# Patient Record
Sex: MALE | Race: WHITE | NOT HISPANIC OR LATINO
[De-identification: names, ages, dates, MRNs, and addresses within clinical notes are randomized per-mention and may not be internally consistent; named-entity substitution may affect disease eponyms.]

---

## 2017-03-09 ENCOUNTER — RX RENEWAL (OUTPATIENT)
Age: 72
End: 2017-03-09

## 2017-04-11 ENCOUNTER — NON-APPOINTMENT (OUTPATIENT)
Age: 72
End: 2017-04-11

## 2017-04-11 ENCOUNTER — APPOINTMENT (OUTPATIENT)
Dept: CARDIOLOGY | Facility: CLINIC | Age: 72
End: 2017-04-11

## 2017-04-11 VITALS — SYSTOLIC BLOOD PRESSURE: 120 MMHG | DIASTOLIC BLOOD PRESSURE: 84 MMHG

## 2017-04-11 VITALS
HEART RATE: 71 BPM | WEIGHT: 145 LBS | SYSTOLIC BLOOD PRESSURE: 140 MMHG | BODY MASS INDEX: 20.76 KG/M2 | OXYGEN SATURATION: 99 % | HEIGHT: 70 IN | DIASTOLIC BLOOD PRESSURE: 70 MMHG

## 2017-04-11 RX ORDER — CEFUROXIME AXETIL 500 MG/1
500 TABLET ORAL
Qty: 20 | Refills: 0 | Status: COMPLETED | COMMUNITY
Start: 2016-12-20

## 2017-04-12 LAB
25(OH)D3 SERPL-MCNC: 43.1 NG/ML
ALBUMIN SERPL ELPH-MCNC: 4.4 G/DL
ALP BLD-CCNC: 64 U/L
ALT SERPL-CCNC: 15 U/L
ANION GAP SERPL CALC-SCNC: 19 MMOL/L
APPEARANCE: CLEAR
AST SERPL-CCNC: 16 U/L
BASOPHILS # BLD AUTO: 0.03 K/UL
BASOPHILS NFR BLD AUTO: 0.5 %
BILIRUB SERPL-MCNC: 0.7 MG/DL
BILIRUBIN URINE: NEGATIVE
BLOOD URINE: NEGATIVE
BUN SERPL-MCNC: 16 MG/DL
CALCIUM SERPL-MCNC: 9.5 MG/DL
CHLORIDE SERPL-SCNC: 102 MMOL/L
CHOLEST SERPL-MCNC: 233 MG/DL
CHOLEST/HDLC SERPL: 2.9 RATIO
CO2 SERPL-SCNC: 22 MMOL/L
COLOR: YELLOW
CREAT SERPL-MCNC: 1.05 MG/DL
EOSINOPHIL # BLD AUTO: 0.27 K/UL
EOSINOPHIL NFR BLD AUTO: 4.7 %
GLUCOSE QUALITATIVE U: NORMAL MG/DL
GLUCOSE SERPL-MCNC: 90 MG/DL
HBA1C MFR BLD HPLC: 5.5 %
HCT VFR BLD CALC: 43.9 %
HDLC SERPL-MCNC: 79 MG/DL
HGB BLD-MCNC: 14 G/DL
IMM GRANULOCYTES NFR BLD AUTO: 0.2 %
KETONES URINE: NEGATIVE
LDLC SERPL CALC-MCNC: 143 MG/DL
LEUKOCYTE ESTERASE URINE: NEGATIVE
LYMPHOCYTES # BLD AUTO: 1.41 K/UL
LYMPHOCYTES NFR BLD AUTO: 24.5 %
MAN DIFF?: NORMAL
MCHC RBC-ENTMCNC: 31.5 PG
MCHC RBC-ENTMCNC: 31.9 GM/DL
MCV RBC AUTO: 98.7 FL
MONOCYTES # BLD AUTO: 0.42 K/UL
MONOCYTES NFR BLD AUTO: 7.3 %
NEUTROPHILS # BLD AUTO: 3.62 K/UL
NEUTROPHILS NFR BLD AUTO: 62.8 %
NITRITE URINE: NEGATIVE
PH URINE: 5
PLATELET # BLD AUTO: 265 K/UL
POTASSIUM SERPL-SCNC: 5 MMOL/L
PROT SERPL-MCNC: 6.8 G/DL
PROTEIN URINE: NEGATIVE MG/DL
PSA FREE FLD-MCNC: 25.6 %
PSA FREE SERPL-MCNC: 0.34 NG/ML
PSA SERPL-MCNC: 1.31 NG/ML
RBC # BLD: 4.45 M/UL
RBC # FLD: 13.6 %
SODIUM SERPL-SCNC: 143 MMOL/L
SPECIFIC GRAVITY URINE: 1.02
T4 SERPL-MCNC: 7 UG/DL
TRIGL SERPL-MCNC: 55 MG/DL
TSH SERPL-ACNC: 1.99 UIU/ML
UROBILINOGEN URINE: NORMAL MG/DL
WBC # FLD AUTO: 5.76 K/UL

## 2017-08-15 ENCOUNTER — APPOINTMENT (OUTPATIENT)
Dept: CARDIOLOGY | Facility: CLINIC | Age: 72
End: 2017-08-15

## 2017-11-06 ENCOUNTER — NON-APPOINTMENT (OUTPATIENT)
Age: 72
End: 2017-11-06

## 2017-11-06 ENCOUNTER — APPOINTMENT (OUTPATIENT)
Dept: CARDIOLOGY | Facility: CLINIC | Age: 72
End: 2017-11-06
Payer: MEDICARE

## 2017-11-06 VITALS
OXYGEN SATURATION: 99 % | WEIGHT: 147 LBS | HEART RATE: 69 BPM | DIASTOLIC BLOOD PRESSURE: 68 MMHG | SYSTOLIC BLOOD PRESSURE: 161 MMHG | BODY MASS INDEX: 21.09 KG/M2

## 2017-11-06 PROCEDURE — G0008: CPT

## 2017-11-06 PROCEDURE — 90662 IIV NO PRSV INCREASED AG IM: CPT

## 2017-11-06 PROCEDURE — 93000 ELECTROCARDIOGRAM COMPLETE: CPT

## 2017-11-06 PROCEDURE — 99215 OFFICE O/P EST HI 40 MIN: CPT

## 2017-11-08 LAB
ALBUMIN SERPL ELPH-MCNC: 4.4 G/DL
ALP BLD-CCNC: 67 U/L
ALT SERPL-CCNC: 20 U/L
ANION GAP SERPL CALC-SCNC: 15 MMOL/L
AST SERPL-CCNC: 22 U/L
BASOPHILS # BLD AUTO: 0.05 K/UL
BASOPHILS NFR BLD AUTO: 0.8 %
BILIRUB SERPL-MCNC: 0.6 MG/DL
BUN SERPL-MCNC: 17 MG/DL
CALCIUM SERPL-MCNC: 9.2 MG/DL
CHLORIDE SERPL-SCNC: 105 MMOL/L
CHOLEST SERPL-MCNC: 195 MG/DL
CHOLEST/HDLC SERPL: 2.8 RATIO
CO2 SERPL-SCNC: 22 MMOL/L
CREAT SERPL-MCNC: 0.92 MG/DL
EOSINOPHIL # BLD AUTO: 0.33 K/UL
EOSINOPHIL NFR BLD AUTO: 5.1 %
GLUCOSE SERPL-MCNC: 85 MG/DL
HBA1C MFR BLD HPLC: 5.2 %
HCT VFR BLD CALC: 41 %
HDLC SERPL-MCNC: 70 MG/DL
HGB BLD-MCNC: 13.2 G/DL
IMM GRANULOCYTES NFR BLD AUTO: 0.2 %
LDLC SERPL CALC-MCNC: 114 MG/DL
LYMPHOCYTES # BLD AUTO: 1.33 K/UL
LYMPHOCYTES NFR BLD AUTO: 20.6 %
MAN DIFF?: NORMAL
MCHC RBC-ENTMCNC: 31.9 PG
MCHC RBC-ENTMCNC: 32.2 GM/DL
MCV RBC AUTO: 99 FL
MONOCYTES # BLD AUTO: 0.47 K/UL
MONOCYTES NFR BLD AUTO: 7.3 %
NEUTROPHILS # BLD AUTO: 4.28 K/UL
NEUTROPHILS NFR BLD AUTO: 66 %
PLATELET # BLD AUTO: 245 K/UL
POTASSIUM SERPL-SCNC: 4.7 MMOL/L
PROT SERPL-MCNC: 6.7 G/DL
RBC # BLD: 4.14 M/UL
RBC # FLD: 13.4 %
SODIUM SERPL-SCNC: 142 MMOL/L
T3 SERPL-MCNC: 103 NG/DL
T4 FREE SERPL-MCNC: 1 NG/DL
T4 SERPL-MCNC: 5.9 UG/DL
TRIGL SERPL-MCNC: 54 MG/DL
TSH SERPL-ACNC: 1.66 UIU/ML
WBC # FLD AUTO: 6.47 K/UL

## 2017-11-10 ENCOUNTER — APPOINTMENT (OUTPATIENT)
Dept: CARDIOLOGY | Facility: CLINIC | Age: 72
End: 2017-11-10
Payer: MEDICARE

## 2017-11-10 PROCEDURE — 93880 EXTRACRANIAL BILAT STUDY: CPT

## 2017-11-13 ENCOUNTER — APPOINTMENT (OUTPATIENT)
Dept: CARDIOLOGY | Facility: CLINIC | Age: 72
End: 2017-11-13
Payer: MEDICARE

## 2017-11-13 DIAGNOSIS — Z86.79 PERSONAL HISTORY OF OTHER DISEASES OF THE CIRCULATORY SYSTEM: ICD-10-CM

## 2017-11-13 PROCEDURE — 93306 TTE W/DOPPLER COMPLETE: CPT

## 2017-12-07 ENCOUNTER — APPOINTMENT (OUTPATIENT)
Dept: CARDIOLOGY | Facility: CLINIC | Age: 72
End: 2017-12-07
Payer: MEDICARE

## 2017-12-07 PROCEDURE — A9500: CPT

## 2017-12-07 PROCEDURE — 93015 CV STRESS TEST SUPVJ I&R: CPT

## 2017-12-07 PROCEDURE — 78452 HT MUSCLE IMAGE SPECT MULT: CPT

## 2018-03-04 ENCOUNTER — RX RENEWAL (OUTPATIENT)
Age: 73
End: 2018-03-04

## 2018-03-14 ENCOUNTER — NON-APPOINTMENT (OUTPATIENT)
Age: 73
End: 2018-03-14

## 2018-03-14 ENCOUNTER — APPOINTMENT (OUTPATIENT)
Dept: CARDIOLOGY | Facility: CLINIC | Age: 73
End: 2018-03-14
Payer: MEDICARE

## 2018-03-14 VITALS
DIASTOLIC BLOOD PRESSURE: 70 MMHG | OXYGEN SATURATION: 97 % | SYSTOLIC BLOOD PRESSURE: 130 MMHG | HEIGHT: 70 IN | WEIGHT: 147 LBS | BODY MASS INDEX: 21.05 KG/M2 | HEART RATE: 70 BPM

## 2018-03-14 VITALS — SYSTOLIC BLOOD PRESSURE: 148 MMHG | DIASTOLIC BLOOD PRESSURE: 82 MMHG

## 2018-03-14 PROCEDURE — 93000 ELECTROCARDIOGRAM COMPLETE: CPT

## 2018-03-14 PROCEDURE — 99214 OFFICE O/P EST MOD 30 MIN: CPT

## 2018-03-15 LAB
25(OH)D3 SERPL-MCNC: 37.1 NG/ML
ALBUMIN SERPL ELPH-MCNC: 4.6 G/DL
ALP BLD-CCNC: 73 U/L
ALT SERPL-CCNC: 18 U/L
ANION GAP SERPL CALC-SCNC: 16 MMOL/L
APPEARANCE: CLEAR
AST SERPL-CCNC: 22 U/L
BASOPHILS # BLD AUTO: 0.03 K/UL
BASOPHILS NFR BLD AUTO: 0.4 %
BILIRUB SERPL-MCNC: 0.4 MG/DL
BILIRUBIN URINE: NEGATIVE
BLOOD URINE: NEGATIVE
BUN SERPL-MCNC: 17 MG/DL
CALCIUM SERPL-MCNC: 9.9 MG/DL
CHLORIDE SERPL-SCNC: 102 MMOL/L
CHOLEST SERPL-MCNC: 153 MG/DL
CHOLEST/HDLC SERPL: 1.9 RATIO
CO2 SERPL-SCNC: 23 MMOL/L
COLOR: YELLOW
CREAT SERPL-MCNC: 1.13 MG/DL
EOSINOPHIL # BLD AUTO: 0.31 K/UL
EOSINOPHIL NFR BLD AUTO: 3.9 %
ESTIMATED AVERAGE GLUCOSE: 111 MG/DL
GLUCOSE QUALITATIVE U: NEGATIVE MG/DL
GLUCOSE SERPL-MCNC: 92 MG/DL
HBA1C MFR BLD HPLC: 5.5 %
HCT VFR BLD CALC: 44 %
HDLC SERPL-MCNC: 81 MG/DL
HGB BLD-MCNC: 15.1 G/DL
IMM GRANULOCYTES NFR BLD AUTO: 0.4 %
KETONES URINE: NEGATIVE
LDLC SERPL CALC-MCNC: 60 MG/DL
LEUKOCYTE ESTERASE URINE: NEGATIVE
LYMPHOCYTES # BLD AUTO: 1.5 K/UL
LYMPHOCYTES NFR BLD AUTO: 18.6 %
MAN DIFF?: NORMAL
MCHC RBC-ENTMCNC: 33.1 PG
MCHC RBC-ENTMCNC: 34.3 GM/DL
MCV RBC AUTO: 96.5 FL
MONOCYTES # BLD AUTO: 0.66 K/UL
MONOCYTES NFR BLD AUTO: 8.2 %
NEUTROPHILS # BLD AUTO: 5.52 K/UL
NEUTROPHILS NFR BLD AUTO: 68.5 %
NITRITE URINE: NEGATIVE
PH URINE: 5
PLATELET # BLD AUTO: 238 K/UL
POTASSIUM SERPL-SCNC: 5.2 MMOL/L
PROT SERPL-MCNC: 7.3 G/DL
PROTEIN URINE: NEGATIVE MG/DL
PSA FREE FLD-MCNC: 35.6
PSA FREE SERPL-MCNC: 0.36 NG/ML
PSA SERPL-MCNC: 1.01 NG/ML
RBC # BLD: 4.56 M/UL
RBC # FLD: 13 %
SODIUM SERPL-SCNC: 141 MMOL/L
SPECIFIC GRAVITY URINE: 1.02
T4 SERPL-MCNC: 6.6 UG/DL
TRIGL SERPL-MCNC: 61 MG/DL
TSH SERPL-ACNC: 2.54 UIU/ML
UROBILINOGEN URINE: NEGATIVE MG/DL
WBC # FLD AUTO: 8.05 K/UL

## 2018-05-01 ENCOUNTER — MEDICATION RENEWAL (OUTPATIENT)
Age: 73
End: 2018-05-01

## 2018-07-24 ENCOUNTER — NON-APPOINTMENT (OUTPATIENT)
Age: 73
End: 2018-07-24

## 2018-07-24 ENCOUNTER — APPOINTMENT (OUTPATIENT)
Dept: CARDIOLOGY | Facility: CLINIC | Age: 73
End: 2018-07-24
Payer: MEDICARE

## 2018-07-24 VITALS
HEIGHT: 70 IN | HEART RATE: 81 BPM | BODY MASS INDEX: 21.19 KG/M2 | WEIGHT: 148 LBS | OXYGEN SATURATION: 98 % | DIASTOLIC BLOOD PRESSURE: 91 MMHG | SYSTOLIC BLOOD PRESSURE: 147 MMHG

## 2018-07-24 PROCEDURE — 93000 ELECTROCARDIOGRAM COMPLETE: CPT

## 2018-07-24 PROCEDURE — 99214 OFFICE O/P EST MOD 30 MIN: CPT

## 2018-09-28 ENCOUNTER — RX RENEWAL (OUTPATIENT)
Age: 73
End: 2018-09-28

## 2018-12-20 ENCOUNTER — MEDICATION RENEWAL (OUTPATIENT)
Age: 73
End: 2018-12-20

## 2018-12-27 ENCOUNTER — APPOINTMENT (OUTPATIENT)
Dept: CARDIOLOGY | Facility: CLINIC | Age: 73
End: 2018-12-27

## 2019-02-11 ENCOUNTER — RX RENEWAL (OUTPATIENT)
Age: 74
End: 2019-02-11

## 2019-04-01 ENCOUNTER — APPOINTMENT (OUTPATIENT)
Dept: CARDIOLOGY | Facility: CLINIC | Age: 74
End: 2019-04-01
Payer: MEDICARE

## 2019-04-01 ENCOUNTER — NON-APPOINTMENT (OUTPATIENT)
Age: 74
End: 2019-04-01

## 2019-04-01 VITALS
BODY MASS INDEX: 21.38 KG/M2 | WEIGHT: 149 LBS | DIASTOLIC BLOOD PRESSURE: 83 MMHG | OXYGEN SATURATION: 99 % | HEART RATE: 70 BPM | SYSTOLIC BLOOD PRESSURE: 146 MMHG

## 2019-04-01 PROCEDURE — 99214 OFFICE O/P EST MOD 30 MIN: CPT

## 2019-04-01 PROCEDURE — 93000 ELECTROCARDIOGRAM COMPLETE: CPT

## 2019-04-01 NOTE — REASON FOR VISIT
[FreeTextEntry1] : The patient is here for followup of his hypertension, hypercholesterolemia, mitral valve disorder, and  prediabetes on a blood test. He gets occasional left-sided chest pain that is intermittent, and lasts only seconds . He gets occasional palpitations. He denies any shortness of breath.

## 2019-04-01 NOTE — DISCUSSION/SUMMARY
[FreeTextEntry1] :  The patient was examined. His blood pressure was 146/83, and his pulse was 70.His lungs were clear to auscultation. Cardiac exam was  negative for murmurs rubs or gallops. His abdomen was soft and nontender. No masses were palpated. Upon rectal exam his prostate was smooth with no nodules and was only slightly enlarged. His rectal showed no masses.  His stool was negative for occult blood.The remainder of his physical exam was unremarkable. There's no obvious left inguinal hernia. His EKG showed normal sinus rhythm, and was within normal  limits. Because his hemoglobin A1c was in a prediabetic range, and he has improved his diet we will repeat the glucose and hemoglobin A1c.  He will continue his current medication, and diet ,and will return in 12 months, or earlier if needed. Because of atypical chest pain we'll send the patient for a nuclear stress test to rule out ischemic heart disease. Because of previous carotid artery disease will send him for carotid Dopplers. Lastly because much regurgitation will send him for an echocardiogram.

## 2019-04-01 NOTE — PHYSICAL EXAM
[General Appearance - Well Developed] : well developed [Normal Appearance] : normal appearance [Well Groomed] : well groomed [General Appearance - Well Nourished] : well nourished [No Deformities] : no deformities [General Appearance - In No Acute Distress] : no acute distress [Normal Conjunctiva] : the conjunctiva exhibited no abnormalities [Eyelids - No Xanthelasma] : the eyelids demonstrated no xanthelasmas [Normal Oral Mucosa] : normal oral mucosa [No Oral Pallor] : no oral pallor [No Oral Cyanosis] : no oral cyanosis [Normal Jugular Venous A Waves Present] : normal jugular venous A waves present [Normal Jugular Venous V Waves Present] : normal jugular venous V waves present [No Jugular Venous Briceno A Waves] : no jugular venous briceno A waves [Respiration, Rhythm And Depth] : normal respiratory rhythm and effort [Exaggerated Use Of Accessory Muscles For Inspiration] : no accessory muscle use [Auscultation Breath Sounds / Voice Sounds] : lungs were clear to auscultation bilaterally [Heart Rate And Rhythm] : heart rate and rhythm were normal [Heart Sounds] : normal S1 and S2 [Murmurs] : no murmurs present [Abdomen Soft] : soft [Abdomen Tenderness] : non-tender [Abdomen Mass (___ Cm)] : no abdominal mass palpated [Abnormal Walk] : normal gait [Gait - Sufficient For Exercise Testing] : the gait was sufficient for exercise testing [Nail Clubbing] : no clubbing of the fingernails [Cyanosis, Localized] : no localized cyanosis [Petechial Hemorrhages (___cm)] : no petechial hemorrhages [Skin Color & Pigmentation] : normal skin color and pigmentation [] : no rash [No Venous Stasis] : no venous stasis [Skin Lesions] : no skin lesions [No Skin Ulcers] : no skin ulcer [No Xanthoma] : no  xanthoma was observed [Oriented To Time, Place, And Person] : oriented to person, place, and time [Affect] : the affect was normal [Mood] : the mood was normal [No Anxiety] : not feeling anxious

## 2019-04-01 NOTE — REVIEW OF SYSTEMS
[Sinus Pressure] : sinus pressure [see HPI] : see HPI [Chest Pain] : chest pain [Palpitations] : palpitations [Negative] : Heme/Lymph [Shortness Of Breath] : no shortness of breath

## 2019-04-16 ENCOUNTER — RX RENEWAL (OUTPATIENT)
Age: 74
End: 2019-04-16

## 2019-05-02 ENCOUNTER — APPOINTMENT (OUTPATIENT)
Dept: CARDIOLOGY | Facility: CLINIC | Age: 74
End: 2019-05-02
Payer: MEDICARE

## 2019-05-02 PROCEDURE — A9500: CPT

## 2019-05-02 PROCEDURE — 93880 EXTRACRANIAL BILAT STUDY: CPT

## 2019-05-02 PROCEDURE — 93015 CV STRESS TEST SUPVJ I&R: CPT

## 2019-05-02 PROCEDURE — 93306 TTE W/DOPPLER COMPLETE: CPT

## 2019-05-02 PROCEDURE — 78452 HT MUSCLE IMAGE SPECT MULT: CPT
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
## 2019-05-06 RX ORDER — METOPROLOL SUCCINATE 25 MG/1
25 TABLET, EXTENDED RELEASE ORAL
Qty: 90 | Refills: 3 | Status: DISCONTINUED | COMMUNITY
Start: 2018-03-04 | End: 2019-05-06

## 2019-07-02 ENCOUNTER — RX RENEWAL (OUTPATIENT)
Age: 74
End: 2019-07-02

## 2019-07-02 ENCOUNTER — MEDICATION RENEWAL (OUTPATIENT)
Age: 74
End: 2019-07-02

## 2019-07-18 ENCOUNTER — MEDICATION RENEWAL (OUTPATIENT)
Age: 74
End: 2019-07-18

## 2019-12-03 ENCOUNTER — APPOINTMENT (OUTPATIENT)
Dept: CARDIOLOGY | Facility: CLINIC | Age: 74
End: 2019-12-03
Payer: MEDICARE

## 2019-12-03 ENCOUNTER — LABORATORY RESULT (OUTPATIENT)
Age: 74
End: 2019-12-03

## 2019-12-03 ENCOUNTER — NON-APPOINTMENT (OUTPATIENT)
Age: 74
End: 2019-12-03

## 2019-12-03 VITALS
BODY MASS INDEX: 21.33 KG/M2 | WEIGHT: 149 LBS | SYSTOLIC BLOOD PRESSURE: 152 MMHG | OXYGEN SATURATION: 99 % | HEIGHT: 70 IN | RESPIRATION RATE: 17 BRPM | HEART RATE: 78 BPM | DIASTOLIC BLOOD PRESSURE: 85 MMHG

## 2019-12-03 PROCEDURE — G0008: CPT

## 2019-12-03 PROCEDURE — 90662 IIV NO PRSV INCREASED AG IM: CPT

## 2019-12-03 PROCEDURE — 99214 OFFICE O/P EST MOD 30 MIN: CPT

## 2019-12-03 PROCEDURE — 93000 ELECTROCARDIOGRAM COMPLETE: CPT

## 2019-12-03 NOTE — PHYSICAL EXAM
[General Appearance - Well Nourished] : well nourished [General Appearance - Well Developed] : well developed [Normal Appearance] : normal appearance [Well Groomed] : well groomed [Normal Conjunctiva] : the conjunctiva exhibited no abnormalities [No Deformities] : no deformities [General Appearance - In No Acute Distress] : no acute distress [Normal Oral Mucosa] : normal oral mucosa [Eyelids - No Xanthelasma] : the eyelids demonstrated no xanthelasmas [No Oral Pallor] : no oral pallor [No Oral Cyanosis] : no oral cyanosis [Normal Jugular Venous A Waves Present] : normal jugular venous A waves present [No Jugular Venous Briceno A Waves] : no jugular venous briceno A waves [Normal Jugular Venous V Waves Present] : normal jugular venous V waves present [Respiration, Rhythm And Depth] : normal respiratory rhythm and effort [Heart Rate And Rhythm] : heart rate and rhythm were normal [Auscultation Breath Sounds / Voice Sounds] : lungs were clear to auscultation bilaterally [Exaggerated Use Of Accessory Muscles For Inspiration] : no accessory muscle use [Murmurs] : no murmurs present [Heart Sounds] : normal S1 and S2 [Abdomen Soft] : soft [Abdomen Tenderness] : non-tender [Abdomen Mass (___ Cm)] : no abdominal mass palpated [Abnormal Walk] : normal gait [Gait - Sufficient For Exercise Testing] : the gait was sufficient for exercise testing [Nail Clubbing] : no clubbing of the fingernails [Skin Color & Pigmentation] : normal skin color and pigmentation [Cyanosis, Localized] : no localized cyanosis [Petechial Hemorrhages (___cm)] : no petechial hemorrhages [Skin Lesions] : no skin lesions [] : no rash [No Venous Stasis] : no venous stasis [No Xanthoma] : no  xanthoma was observed [No Skin Ulcers] : no skin ulcer [Mood] : the mood was normal [Affect] : the affect was normal [Oriented To Time, Place, And Person] : oriented to person, place, and time [No Anxiety] : not feeling anxious

## 2019-12-05 LAB
25(OH)D3 SERPL-MCNC: 38.9 NG/ML
ALBUMIN SERPL ELPH-MCNC: 4.7 G/DL
ALP BLD-CCNC: 74 U/L
ALT SERPL-CCNC: 14 U/L
ANION GAP SERPL CALC-SCNC: 14 MMOL/L
AST SERPL-CCNC: 19 U/L
B BURGDOR IGG+IGM SER QL IB: NORMAL
BASOPHILS # BLD AUTO: 0.04 K/UL
BASOPHILS NFR BLD AUTO: 0.4 %
BILIRUB SERPL-MCNC: 0.6 MG/DL
BUN SERPL-MCNC: 14 MG/DL
CALCIUM SERPL-MCNC: 9.5 MG/DL
CHLORIDE SERPL-SCNC: 106 MMOL/L
CHOLEST SERPL-MCNC: 145 MG/DL
CHOLEST/HDLC SERPL: 2.1 RATIO
CO2 SERPL-SCNC: 23 MMOL/L
CREAT SERPL-MCNC: 1.02 MG/DL
EOSINOPHIL # BLD AUTO: 0.03 K/UL
EOSINOPHIL NFR BLD AUTO: 0.3 %
ESTIMATED AVERAGE GLUCOSE: 108 MG/DL
GLUCOSE SERPL-MCNC: 104 MG/DL
HBA1C MFR BLD HPLC: 5.4 %
HCT VFR BLD CALC: 45.8 %
HDLC SERPL-MCNC: 69 MG/DL
HGB BLD-MCNC: 14.6 G/DL
IMM GRANULOCYTES NFR BLD AUTO: 0.3 %
LDLC SERPL CALC-MCNC: 66 MG/DL
LYMPHOCYTES # BLD AUTO: 0.85 K/UL
LYMPHOCYTES NFR BLD AUTO: 9.1 %
MAN DIFF?: NORMAL
MCHC RBC-ENTMCNC: 31.6 PG
MCHC RBC-ENTMCNC: 31.9 GM/DL
MCV RBC AUTO: 99.1 FL
MONOCYTES # BLD AUTO: 0.55 K/UL
MONOCYTES NFR BLD AUTO: 5.9 %
NEUTROPHILS # BLD AUTO: 7.79 K/UL
NEUTROPHILS NFR BLD AUTO: 84 %
PLATELET # BLD AUTO: 242 K/UL
POTASSIUM SERPL-SCNC: 4.7 MMOL/L
PROT SERPL-MCNC: 7.2 G/DL
RBC # BLD: 4.62 M/UL
RBC # FLD: 12.8 %
SODIUM SERPL-SCNC: 143 MMOL/L
T4 SERPL-MCNC: 5.8 UG/DL
TRIGL SERPL-MCNC: 52 MG/DL
TSH SERPL-ACNC: 2.2 UIU/ML
WBC # FLD AUTO: 9.29 K/UL

## 2019-12-19 ENCOUNTER — OTHER (OUTPATIENT)
Age: 74
End: 2019-12-19

## 2019-12-19 DIAGNOSIS — M54.5 LOW BACK PAIN: ICD-10-CM

## 2020-01-28 ENCOUNTER — APPOINTMENT (OUTPATIENT)
Dept: MRI IMAGING | Facility: CLINIC | Age: 75
End: 2020-01-28
Payer: MEDICARE

## 2020-01-28 PROCEDURE — 72148 MRI LUMBAR SPINE W/O DYE: CPT

## 2020-03-05 NOTE — DISCUSSION/SUMMARY
[FreeTextEntry1] :  The patient was examined. His blood pressure was 152/81, and his pulse was 78.His lungs were clear to auscultation. Cardiac exam was  negative for murmurs rubs or gallops. His abdomen was soft and nontender. No masses were palpated. Upon rectal exam his prostate was smooth with no nodules and was only slightly enlarged. His rectal showed no masses.  His stool was negative for occult blood.The remainder of his physical exam was unremarkable. There's no obvious  inguinal hernia. His EKG showed normal sinus rhythm, and was within normal  limits. Because his hemoglobin A1c was in a prediabetic range, and he has improved his diet we will repeat the glucose and hemoglobin A1c.  He will continue his current medication, and diet ,and will return in 12 months, or earlier if needed.  A high-dose shot was administered today. The patient will get routine blood tests including a Lyme titer because of his previous tick bite.

## 2020-03-05 NOTE — REVIEW OF SYSTEMS
[Sinus Pressure] : sinus pressure [see HPI] : see HPI [Chest Pain] : chest pain [Negative] : Heme/Lymph [Shortness Of Breath] : no shortness of breath [Palpitations] : no palpitations

## 2020-03-05 NOTE — REASON FOR VISIT
[FreeTextEntry1] : The patient is here for followup of his hypertension, hypercholesterolemia, mitral valve disorder, and  prediabetes on a blood test. He gets occasional left-sided chest pain that is intermittent, and lasts only seconds . He gets occasional palpitations. He denies any shortness of breath.\par December 3, 2019: The patient has no new complaints. When he gets anxious sometimes he gets chest discomfort but is not new and seems to go away after he makes and eats supper. He denies any shortness of breath or recent palpitations.

## 2020-03-22 ENCOUNTER — NON-APPOINTMENT (OUTPATIENT)
Age: 75
End: 2020-03-22

## 2020-03-23 ENCOUNTER — RX RENEWAL (OUTPATIENT)
Age: 75
End: 2020-03-23

## 2020-08-28 ENCOUNTER — RX RENEWAL (OUTPATIENT)
Age: 75
End: 2020-08-28

## 2020-11-26 ENCOUNTER — NON-APPOINTMENT (OUTPATIENT)
Age: 75
End: 2020-11-26

## 2021-02-26 ENCOUNTER — APPOINTMENT (OUTPATIENT)
Dept: CARDIOLOGY | Facility: CLINIC | Age: 76
End: 2021-02-26
Payer: MEDICARE

## 2021-02-26 ENCOUNTER — NON-APPOINTMENT (OUTPATIENT)
Age: 76
End: 2021-02-26

## 2021-02-26 VITALS
BODY MASS INDEX: 21.72 KG/M2 | OXYGEN SATURATION: 98 % | HEIGHT: 69.5 IN | WEIGHT: 150 LBS | TEMPERATURE: 98.2 F | SYSTOLIC BLOOD PRESSURE: 142 MMHG | HEART RATE: 70 BPM | DIASTOLIC BLOOD PRESSURE: 90 MMHG

## 2021-02-26 DIAGNOSIS — I65.23 OCCLUSION AND STENOSIS OF BILATERAL CAROTID ARTERIES: ICD-10-CM

## 2021-02-26 DIAGNOSIS — C64.9 MALIGNANT NEOPLASM OF UNSPECIFIED KIDNEY, EXCEPT RENAL PELVIS: ICD-10-CM

## 2021-02-26 DIAGNOSIS — N41.1 CHRONIC PROSTATITIS: ICD-10-CM

## 2021-02-26 PROCEDURE — 99214 OFFICE O/P EST MOD 30 MIN: CPT

## 2021-02-26 PROCEDURE — 93000 ELECTROCARDIOGRAM COMPLETE: CPT

## 2021-02-26 RX ORDER — TAMSULOSIN HYDROCHLORIDE 0.4 MG/1
0.4 CAPSULE ORAL
Qty: 90 | Refills: 2 | Status: ACTIVE | COMMUNITY

## 2021-02-26 RX ORDER — ROSUVASTATIN CALCIUM 5 MG/1
5 TABLET, FILM COATED ORAL
Qty: 90 | Refills: 2 | Status: DISCONTINUED | COMMUNITY
Start: 2020-03-22 | End: 2021-02-26

## 2021-02-26 RX ORDER — ROSUVASTATIN CALCIUM 5 MG/1
5 TABLET, FILM COATED ORAL
Qty: 90 | Refills: 2 | Status: DISCONTINUED | COMMUNITY
Start: 2020-11-26 | End: 2021-02-26

## 2021-02-28 NOTE — ASSESSMENT
[FreeTextEntry1] : \par History of multiple CRF, valvular heart disease and atypical chest discomfort.  \par Follow-up ultrasound imaging and stress test\par \par Continue antiplatelet therapy.  Unclear indication for dual antiplatelet therapy.  \par Continue beta-blocker.  \par Continue statin.  No adverse effects.  \par \par Preoperative status [catarat] \par 02/26/2021 \par At present, there are no active cardiac conditions. \par No recent unstable coronary syndromes, decompensated heart failure, severe valvular heart disease or significant dysrhythmias.  \par Baseline functional status is excellemt .    \par The clinical benefit of the proposed procedure outweighs the associated cardiovascular risk.  \par

## 2021-02-28 NOTE — HISTORY OF PRESENT ILLNESS
[FreeTextEntry1] : EBEN JIMENES JR  is a 75 year old  M\par \par Prior cardiologist in [place].  \par \par h/o HTN, HL, atherosclerosis, RCCa, VHD, pre DM\par Takes metoprolol for hypertension and rosuvastatin for hyperlipidemia \par Also started on Plavix for atherosclerosis \par History of clear-cell renal cell carcinoma followed at [facility] [place]\par There is no prior history of a clinical myocardial infarction, coronary revascularization. \par There is no history of symptomatic congestive heart failure rheumatic heart disease\par There is no history of symptomatic arrhythmias including atrial fibrillation.\par \par Walks several miles.  \par There is no exertional chest pain, pressure or discomfort. \par There is no significant dyspnea on exertion or orthopnea. \par There are no symptomatic palpitations, lightheadedness, dizziness or syncope.\par \par Upcoming bilateral carotid cataract procedure.  \par \par Last hemoglobin 14.6 A1c 5.4 creatinine 1.0 total cholesterol 145 LDL 66\par EKG NSR\par \par Now lives full-time in the [place].  Retired.

## 2021-04-05 ENCOUNTER — APPOINTMENT (OUTPATIENT)
Dept: CARDIOLOGY | Facility: CLINIC | Age: 76
End: 2021-04-05
Payer: MEDICARE

## 2021-04-05 ENCOUNTER — NON-APPOINTMENT (OUTPATIENT)
Age: 76
End: 2021-04-05

## 2021-04-05 VITALS
HEART RATE: 79 BPM | HEIGHT: 69 IN | BODY MASS INDEX: 21.92 KG/M2 | OXYGEN SATURATION: 98 % | TEMPERATURE: 98 F | DIASTOLIC BLOOD PRESSURE: 86 MMHG | WEIGHT: 148 LBS | SYSTOLIC BLOOD PRESSURE: 124 MMHG

## 2021-04-05 DIAGNOSIS — Z01.810 ENCOUNTER FOR PREPROCEDURAL CARDIOVASCULAR EXAMINATION: ICD-10-CM

## 2021-04-05 PROCEDURE — 93880 EXTRACRANIAL BILAT STUDY: CPT

## 2021-04-05 PROCEDURE — 93306 TTE W/DOPPLER COMPLETE: CPT

## 2021-04-05 PROCEDURE — 99214 OFFICE O/P EST MOD 30 MIN: CPT

## 2021-04-05 PROCEDURE — 93000 ELECTROCARDIOGRAM COMPLETE: CPT | Mod: NC

## 2021-04-05 PROCEDURE — 93979 VASCULAR STUDY: CPT

## 2021-04-05 RX ORDER — CLOPIDOGREL BISULFATE 75 MG/1
75 TABLET, FILM COATED ORAL DAILY
Refills: 0 | Status: DISCONTINUED | COMMUNITY
End: 2021-04-05

## 2021-04-05 NOTE — ADDENDUM
[FreeTextEntry1] : Please note the patient was reviewed with the NP.\par I was physically present during the service of the patient\william I was directly involved in the management plan and recommendations of care provided to the patient. \par I personally reviewed the history and physical exam and plan as documented by the NP above.\par \par Wellington Winkler DO, FACC, RPVI\par Cardiologist\par 04/5/2021

## 2021-04-05 NOTE — ASSESSMENT
[FreeTextEntry1] : EBEN JIMENES JR is a 75 year old M who presents today Apr 05, 2021 with the above history and the following active issues:\par \par HTN: BP well controlled. on Metoprolol Succinate 50mg daily.\par \par HLD: Continue Crestor 5mg daily.\par \par Echo, carotid, and abd u/s 4/5/21 without any high risk features.\par \par There is atypical chest discomfort that is relieved with belching. EKG performed,. SB with nonspecific changes. Echo with preserved EF and normal wall motion. Stress testing is scheduled for 5/5/21.\par \par Preoperative cardiovascular examination.\par Patient may proceed with right eye cataract surgery 4/8/21 and left eye cataract surgery 4/23/21 with Dr. Roberto Herrera at [facility].\par At present, there are no active cardiac conditions. \par No recent unstable coronary syndromes, decompensated heart failure, severe valvular heart disease or significant dysrhythmias.  \par Baseline functional status is excellent.\par The clinical benefit of the proposed procedure outweighs the associated cardiovascular risk.  \par Risk not attenuated with further CV testing.  \par Prior testing as outlined above.\par Optimized from a cardiovascular perspective.\par Remain on ASA and Plavix.\par Continue beta blocker\par DVT ppx\par \par \par Continue antiplatelet therapy.  Unclear indication for dual antiplatelet therapy.  \par Continue beta-blocker.  \par Continue statin.  No adverse effects.  \par \par F/U after testing to review results (nuclear stress test).\par Discussed red flag symptoms, which would warrant sooner or emergent medical evaluation.\par Any questions and concerns were addressed and resolved.\par \par Sincerely,\par Mai Estevez FN-BC\par Patient's history, testing, and plan was reviewed with supervising physician, Dr. Wellington Winkler\par

## 2021-04-05 NOTE — HISTORY OF PRESENT ILLNESS
[FreeTextEntry1] : EBEN JIMENES JR is a 75 year old male with a past medical history of HTN, HL, atherosclerosis, RCCa, VHD, pre DM. History of clear-cell renal cell carcinoma [place] [facility] [place]\par \par Takes metoprolol for hypertension and rosuvastatin for hyperlipidemia \par Also started on Plavix for atherosclerosis \par \par There is no prior history of a clinical myocardial infarction, coronary revascularization. \par There is no history of symptomatic congestive heart failure rheumatic heart disease\par There is no history of symptomatic arrhythmias including atrial fibrillation.\par \par Last seen 2/26/21. Ultrasound imaging and stress testing ordered. See details below of ultrasound imaging. Stress test is scheduled for 5/5/21. Does report an atypical chest discomfort/left sided that sometimes occurs while walking, but relieved with belching. He denies  palpitations, unusual shortness of breath, orthopnea, LE edema, lightheadedness, dizziness, near syncope or syncope. Remote smoking hx in his 20's. States he is able to walk 2-4 miles a day without any significant CP or SOB.\par \par Now lives full-time in the [place].  Retired.\par \par Testing:\par \par EKG 4/5/21: SB at 59 bpm with nonspecific ST-T wave abnormalities \par \par Carotids 4/5/21: Moderate plaque. Nonobstructive. Elevated veloities noted in the left bulb.\par \par Abd u/s 4/5/21: No AAA. Mild plaque. Ectatic abd aorta.\par \par Echo 4/5/21: EF 55-60%. Minimal MR. Normal wall motion. Minimal TR. Minimal ME. Thickened pericardium. Compared to echo 5/2/19, no sig changes noted.\par \par Labs 12/5/19 14.6 A1c 5.4 creatinine 1.0 total cholesterol 145 LDL 66\par EKG NSR\par \par

## 2021-04-18 ENCOUNTER — RX RENEWAL (OUTPATIENT)
Age: 76
End: 2021-04-18

## 2021-04-20 ENCOUNTER — TRANSCRIPTION ENCOUNTER (OUTPATIENT)
Age: 76
End: 2021-04-20

## 2021-05-05 ENCOUNTER — APPOINTMENT (OUTPATIENT)
Dept: CARDIOLOGY | Facility: CLINIC | Age: 76
End: 2021-05-05
Payer: MEDICARE

## 2021-05-05 PROCEDURE — 93351 STRESS TTE COMPLETE: CPT

## 2021-05-20 ENCOUNTER — APPOINTMENT (OUTPATIENT)
Dept: CARDIOLOGY | Facility: CLINIC | Age: 76
End: 2021-05-20
Payer: MEDICARE

## 2021-05-20 VITALS
HEIGHT: 69 IN | OXYGEN SATURATION: 98 % | DIASTOLIC BLOOD PRESSURE: 70 MMHG | HEART RATE: 65 BPM | WEIGHT: 152 LBS | SYSTOLIC BLOOD PRESSURE: 136 MMHG | BODY MASS INDEX: 22.51 KG/M2 | TEMPERATURE: 96.8 F

## 2021-05-20 DIAGNOSIS — R94.39 ABNORMAL RESULT OF OTHER CARDIOVASCULAR FUNCTION STUDY: ICD-10-CM

## 2021-05-20 DIAGNOSIS — E78.00 PURE HYPERCHOLESTEROLEMIA, UNSPECIFIED: ICD-10-CM

## 2021-05-20 DIAGNOSIS — Z87.898 PERSONAL HISTORY OF OTHER SPECIFIED CONDITIONS: ICD-10-CM

## 2021-05-20 PROCEDURE — 99214 OFFICE O/P EST MOD 30 MIN: CPT

## 2021-05-20 RX ORDER — PREDNISOLONE ACETATE 10 MG/ML
1 SUSPENSION/ DROPS OPHTHALMIC
Qty: 5 | Refills: 0 | Status: DISCONTINUED | COMMUNITY
Start: 2021-03-24 | End: 2021-05-20

## 2021-05-20 RX ORDER — OFLOXACIN 3 MG/ML
0.3 SOLUTION/ DROPS OPHTHALMIC
Qty: 5 | Refills: 0 | Status: DISCONTINUED | COMMUNITY
Start: 2021-03-24 | End: 2021-05-20

## 2021-05-20 NOTE — HISTORY OF PRESENT ILLNESS
[FreeTextEntry1] : EBEN JIMENES JR is a 75 year old male with a past medical history of HTN, HL, mod carotid atherosclerosis, RCCa, VHD, pre DM. History of clear-cell renal cell carcinoma followed at [facility] [place].\par \par He presents today to review results of recent CV testing. \par \par There is no prior history of a clinical myocardial infarction, coronary revascularization. \par There is no history of symptomatic congestive heart failure rheumatic heart disease\par There is no history of symptomatic arrhythmias including atrial fibrillation.\par \par States he is able to walk 2-4 miles a day without any significant CP or SOB. Reports an atypical chest discomfort/left sided that sometimes occurs while walking, but relieved with belching. He denies  palpitations, unusual shortness of breath, orthopnea, LE edema, lightheadedness, dizziness, near syncope or syncope.\par \par Now lives full-time in the [place].  Retired.\par \par On 5/5/21 pt in office for stress echo today. Pt did excellent on ETT, 10min 30sec Giovanni protocol with no chest pain and no evidence of ischemia by EKG. Imaging showed hypokinesis of basal to mid inferoseptum/anteroseptum. Of note past nuclear imaging May 2019 also showed mild fixed defects basal inferoseptal walls. \par \par He was then sent for CCTA 5/17/21. Total Ca score 1774. \par LM 1\par  <50% stenosis\par LCx 138 <30% stenosis\par RCA 1072 <50% stenosis\par \par \par Labs 4/19/21 , HDL 62, LDL 66 - on crestor 5mg daily\par \par EKG 4/5/21: SB at 59 bpm with nonspecific ST-T wave abnormalities \par \par Carotids 4/5/21: Moderate plaque. Nonobstructive. Elevated veloities noted in the left bulb.\par \par Abd u/s 4/5/21: No AAA. Mild plaque. Ectatic abd aorta.\par \par Echo 4/5/21: EF 55-60%. Minimal MR. Normal wall motion. Minimal TR. Minimal MO. Thickened pericardium. Compared to echo 5/2/19, no sig changes noted.\par \par \par

## 2021-05-20 NOTE — ASSESSMENT
[FreeTextEntry1] : EBEN JIMENES JR is a 75 year old M who presents today May 20, 2021 to review CV test results. \par \par CAD by CT scan with significant burden of calcified plaque. Total score 1774. \par Nonobstructive, no stenosis identified that is >50%.\par Pt's symptoms are very atypical. High level of workload on ETT for his age without exertional CP. \par Recommend aggressive risk factor reduction and medical therapy. \par Cont antiplatelet, BB, and statin therapy. Ideally uptitrate statin to max tolerated dosing. \par He is on DAPT, reviewed bleeding precautions. \par \par HTN: BP well controlled. on Metoprolol Succinate 50mg daily.\par \par HLD: Although chol well controlled given CAD above will increase crestor to 10mg daily. If any adverse effects he'll notify our office. \par \par Carotid atherosclerosis, mod nonobx. Asx. Antiplatelet and statin rx as above. \par \par MR, mild. Surveillance monitoring. No CHF. \par \par Reviewed limitations of noninvasive testing and if any new or worsening symptoms, especially exertional, should occur advised him to notify our office immediately for further evaluation. Pt verbalizes understanding. Any questions and concerns were addressed and resolved. \par \par Sincerely,\par \par ADRIAN Henry\par Patients history, testing, and plan reviewed with supervising MD: Dr. Wellington Winkler

## 2021-05-20 NOTE — ADDENDUM
[FreeTextEntry1] : Please note the patient was reviewed with the NP.\par I was physically present during the service of the patient\par I was directly involved in the management plan and recommendations of care provided to the patient. \par I personally reviewed the history and physical exam and plan as documented by the NP above.\par \par Wellington Winkler DO, FACC, RPVI\par Cardiologist\par 05/20/2021

## 2021-05-21 ENCOUNTER — RX RENEWAL (OUTPATIENT)
Age: 76
End: 2021-05-21

## 2021-05-26 ENCOUNTER — NON-APPOINTMENT (OUTPATIENT)
Age: 76
End: 2021-05-26

## 2021-06-19 ENCOUNTER — APPOINTMENT (OUTPATIENT)
Dept: DISASTER EMERGENCY | Facility: CLINIC | Age: 76
End: 2021-06-19

## 2021-06-22 ENCOUNTER — OUTPATIENT (OUTPATIENT)
Dept: INPATIENT UNIT | Facility: HOSPITAL | Age: 76
LOS: 1 days | End: 2021-06-22
Payer: MEDICARE

## 2021-06-22 PROCEDURE — 93010 ELECTROCARDIOGRAM REPORT: CPT

## 2021-06-22 PROCEDURE — 93571 IV DOP VEL&/PRESS C FLO 1ST: CPT | Mod: 26,52

## 2021-06-22 PROCEDURE — 93458 L HRT ARTERY/VENTRICLE ANGIO: CPT | Mod: 26

## 2021-06-28 ENCOUNTER — APPOINTMENT (OUTPATIENT)
Dept: CARDIOLOGY | Facility: CLINIC | Age: 76
End: 2021-06-28
Payer: MEDICARE

## 2021-06-28 VITALS — DIASTOLIC BLOOD PRESSURE: 70 MMHG | SYSTOLIC BLOOD PRESSURE: 118 MMHG

## 2021-06-28 VITALS — WEIGHT: 149 LBS | BODY MASS INDEX: 22 KG/M2 | HEART RATE: 62 BPM | OXYGEN SATURATION: 99 % | TEMPERATURE: 97.6 F

## 2021-06-28 PROCEDURE — 99214 OFFICE O/P EST MOD 30 MIN: CPT

## 2021-11-10 ENCOUNTER — NON-APPOINTMENT (OUTPATIENT)
Age: 76
End: 2021-11-10

## 2022-01-28 ENCOUNTER — NON-APPOINTMENT (OUTPATIENT)
Age: 77
End: 2022-01-28

## 2022-01-28 ENCOUNTER — APPOINTMENT (OUTPATIENT)
Dept: CARDIOLOGY | Facility: CLINIC | Age: 77
End: 2022-01-28
Payer: MEDICARE

## 2022-01-28 VITALS
OXYGEN SATURATION: 97 % | HEART RATE: 68 BPM | SYSTOLIC BLOOD PRESSURE: 144 MMHG | WEIGHT: 150 LBS | HEIGHT: 70.5 IN | TEMPERATURE: 97.1 F | DIASTOLIC BLOOD PRESSURE: 60 MMHG | BODY MASS INDEX: 21.24 KG/M2

## 2022-01-28 PROCEDURE — 93000 ELECTROCARDIOGRAM COMPLETE: CPT

## 2022-01-28 PROCEDURE — 99214 OFFICE O/P EST MOD 30 MIN: CPT

## 2022-02-06 NOTE — HISTORY OF PRESENT ILLNESS
[FreeTextEntry1] : EBEN JIMENES  is a 76 year old  M\par h/o HTN, HL, carotid atherosclerosis, VHD, pre DM. History of clear-cell renal cell carcinoma followed at [facility] [place].\par \par There is no prior history of a clinical myocardial infarction, coronary revascularization. \par There is no history of symptomatic congestive heart failure rheumatic heart disease\par There is no history of symptomatic arrhythmias including atrial fibrillation.\par \par Atypical chest discomfort resolved with belching.  Walks 4 to 6 miles.  No functional limitations.\par He denies palpitations, unusual shortness of breath, orthopnea, LE edema, lightheadedness, dizziness, near syncope or syncope.\par \par Prior CAT scan had severely elevated coronary artery calcification.  FFR analysis had potential functional significance.  Recommendation was for coronary angiography.  Blood work June 2021 potassium 4.3 creatinine 1.0 hemoglobin 14.1.  Coronary angiography June 2021 moderate LAD 60% eccentric heavily calcified moderate obtuse marginal 1 mild to moderate RCA FFR negative of LAD medical therapy recommended \par \par LP(a) within normal limits.  \par Blood work November 2021 creatinine 1.0 total cholesterol 146 LDL 68 hemoglobin 15.1.  \par \par Now lives full-time in the [place]. Retired.\par \par Stress echo 10min 30sec Giovanni protocol with no chest pain and no evidence of ischemia by EKG. Imaging showed hypokinesis of basal to mid inferoseptum/anteroseptum. Of note past nuclear imaging May 2019 also showed mild fixed defects basal inferoseptal walls. \par \par He was then sent for CCTA 5/17/21. Total Ca score 1774. \par LM 1\par  <50% stenosis\par LCx 138 <30% stenosis\par RCA 1072 <50% stenosis\par \par Labs 4/19/21 , HDL 62, LDL 66 - on crestor 5mg daily\par EKG 4/5/21: SB at 59 bpm with nonspecific ST-T wave abnormalities \par Carotids 4/5/21: Moderate plaque. Nonobstructive. Elevated veloities noted in the left bulb.\par Abd u/s 4/5/21: No AAA. Mild plaque. Ectatic abd aorta.\par Echo 4/5/21: EF 55-60%. Minimal MR. Normal wall motion. Minimal TR. Minimal MS. Thickened pericardium. Compared to echo 5/2/19, no sig changes noted.\par

## 2022-02-06 NOTE — ASSESSMENT
[FreeTextEntry1] : \par discussed indication for crossover to angiogram guided strategy if symptoms or high risk features on noninvasive testing \par Follow-up echocardiogram and carotid Doppler.  \par Instructed to notify our office if any new symptoms. \par \par CAD by CT scan with significant burden of calcified plaque. Total score 1774. \par Pt's symptoms are very atypical. High level of workload on ETT for his age without exertional CP. \par medical therapy. \par Cont antiplatelet, BB, and statin therapy. Ideally uptitrate statin to max tolerated dosing. \par \par HTN: BP well controlled. on Metoprolol Succinate 50mg daily.\par \par HLD: Although chol well controlled given CAD above will increase crestor to 10mg daily. If any adverse effects he'll notify our office. \par \par Carotid atherosclerosis, mod nonobx. Asx. Antiplatelet and statin rx as above. \par \par MR, mild. Surveillance monitoring. No CHF. \par \par Reviewed limitations of noninvasive testing and if any new or worsening symptoms, especially exertional, should occur advised him to notify our office immediately for further evaluation. Pt verbalizes understanding. Any questions and concerns were addressed and resolved.

## 2022-04-08 ENCOUNTER — APPOINTMENT (OUTPATIENT)
Dept: CARDIOLOGY | Facility: CLINIC | Age: 77
End: 2022-04-08
Payer: MEDICARE

## 2022-04-08 PROCEDURE — 93306 TTE W/DOPPLER COMPLETE: CPT

## 2022-04-08 PROCEDURE — 93880 EXTRACRANIAL BILAT STUDY: CPT

## 2022-05-12 ENCOUNTER — APPOINTMENT (OUTPATIENT)
Dept: CARDIOLOGY | Facility: CLINIC | Age: 77
End: 2022-05-12
Payer: MEDICARE

## 2022-05-12 ENCOUNTER — APPOINTMENT (OUTPATIENT)
Dept: CARDIOLOGY | Facility: CLINIC | Age: 77
End: 2022-05-12

## 2022-05-12 PROCEDURE — 78452 HT MUSCLE IMAGE SPECT MULT: CPT

## 2022-05-12 PROCEDURE — 93242 EXT ECG>48HR<7D RECORDING: CPT

## 2022-05-12 PROCEDURE — 93015 CV STRESS TEST SUPVJ I&R: CPT

## 2022-05-12 PROCEDURE — A9502: CPT

## 2022-05-13 ENCOUNTER — APPOINTMENT (OUTPATIENT)
Dept: CARDIOLOGY | Facility: CLINIC | Age: 77
End: 2022-05-13

## 2022-05-20 ENCOUNTER — APPOINTMENT (OUTPATIENT)
Dept: CARDIOLOGY | Facility: CLINIC | Age: 77
End: 2022-05-20

## 2022-05-23 ENCOUNTER — APPOINTMENT (OUTPATIENT)
Dept: CARDIOLOGY | Facility: CLINIC | Age: 77
End: 2022-05-23

## 2022-05-23 PROCEDURE — 93244 EXT ECG>48HR<7D REV&INTERPJ: CPT

## 2022-06-24 ENCOUNTER — APPOINTMENT (OUTPATIENT)
Dept: CARDIOLOGY | Facility: CLINIC | Age: 77
End: 2022-06-24
Payer: MEDICARE

## 2022-06-24 VITALS
HEIGHT: 70.5 IN | HEART RATE: 72 BPM | TEMPERATURE: 97.5 F | DIASTOLIC BLOOD PRESSURE: 70 MMHG | OXYGEN SATURATION: 99 % | SYSTOLIC BLOOD PRESSURE: 124 MMHG

## 2022-06-24 DIAGNOSIS — I34.0 NONRHEUMATIC MITRAL (VALVE) INSUFFICIENCY: ICD-10-CM

## 2022-06-24 DIAGNOSIS — R00.2 PALPITATIONS: ICD-10-CM

## 2022-06-24 PROCEDURE — 99214 OFFICE O/P EST MOD 30 MIN: CPT

## 2022-06-24 NOTE — ASSESSMENT
[FreeTextEntry1] : EBEN JIMENES is a 76 year old M who presents today Jun 24, 2022 with the above history and the following active issues: \par \par CAD by CT scan with significant burden of calcified plaque. Total score 1774. \par Pt's symptoms are very atypical. High level of workload on ETT for his age without exertional CP. Abnormal nuclear imaging r/w patient, although there are no high risk new ischemic features. \par Discussed ISCHEMIA trial data. Will cont med rx at this time. \par Cont antiplatelet, BB, and statin therapy.\par discussed indication for crossover to angiogram guided strategy if symptoms or high risk features on noninvasive testing \par \par HTN: BP well controlled. on Metoprolol Succinate 50mg daily.\par \par HLD: Cont crestor 10mg daily. LDL is <70.  If any adverse effects he'll notify our office. \par \par Carotid atherosclerosis, mod nonobx. Asx. Antiplatelet and statin rx as above. \par \par MR, mild. Surveillance monitoring. No CHF. \par \par Pt wishes to perform annual surveillance testing including stress imaging. \par Reviewed limitations of noninvasive testing and if any new or worsening symptoms, especially exertional, should occur advised him to notify our office immediately for further evaluation. Pt verbalizes understanding. Any questions and concerns were addressed and resolved. \par \par Sincerely,\par \par ADRIAN Henry\par Patients history, testing, and plan reviewed with supervising MD: Dr. Charli Grey

## 2022-06-24 NOTE — ADDENDUM
[FreeTextEntry1] : Please note the patient was reviewed with NP Ana Jon.\par I was physically present during the service of the patient.\par I was directly involved in the management plan and recommendations of the care provided to the patient. \par I personally reviewed the history and physical examination as documented by the NP above.\par \par

## 2022-06-24 NOTE — HISTORY OF PRESENT ILLNESS
[FreeTextEntry1] : EBEN JIMENES  is a 76 year old  M\par h/o HTN, HL, carotid atherosclerosis, VHD, pre DM. History of clear-cell renal cell carcinoma followed at [facility].\par \par There is no prior history of a clinical myocardial infarction, coronary revascularization. \par There is no history of symptomatic congestive heart failure rheumatic heart disease\par There is no history of symptomatic arrhythmias including atrial fibrillation.\par \par Atypical chest discomfort resolved with belching.  Walks 4 to 6 miles.  No functional limitations. No exertional symptoms whatsoever. \par He denies palpitations, unusual shortness of breath, orthopnea, LE edema, lightheadedness, dizziness, near syncope or syncope.\par \par Prior CAT scan had severely elevated coronary artery calcification.  FFR analysis had potential functional significance.  Recommendation was for coronary angiography.    Coronary angiography June 2021 moderate LAD 60% eccentric heavily calcified moderate obtuse marginal 1 mild to moderate RCA FFR negative of LAD medical therapy recommended \par \par LP(a) within normal limits.  \par \par Now lives full-time in the [place]. Retired.\par \par Labs 6/1/2022 K4.4, creat 1, LDL 66, Hgb 13.5, A1c 5.4, TSH 2.16, \par 4/2022 echo LVEF 55% mild MR normal PASP.  Ascending aorta 3.8 cm\par Carotid Doppler mild to moderate nonobstructive plaque bilaterally no significant change noted\par 3-day monitor May 2022 overall normal sinus rhythm average 68 bpm rare ectopy less than 1% burden\par Nuclear stress test May 2022 exercise 10 minutes 44 seconds normal CV response EKG changes suggestive of ischemia.  Small mild septal wall defect partially reversible consistent with infarct and moderate veronika-infarct ischemia, normal prone imaging.  This is consistent with nuclear stress test findings from 2015 no new ischemia is noted. \par \par Stress echo 20201  10min 30sec Giovanni protocol with no chest pain and no evidence of ischemia by EKG. Imaging showed hypokinesis of basal to mid inferoseptum/anteroseptum. Of note past nuclear imaging May 2019 also showed mild fixed defects basal inferoseptal walls. \par \par He was then sent for CCTA 5/17/21. Total Ca score 1774. \par LM 1\par  <50% stenosis\par LCx 138 <30% stenosis\par RCA 1072 <50% stenosis\par Cardiac cath as detailed above\par \par EKG 4/5/21: SB at 59 bpm with nonspecific ST-T wave abnormalities \par Abd u/s 4/5/21: No AAA. Mild plaque. Ectatic abd aorta.\par

## 2022-10-14 ENCOUNTER — RX RENEWAL (OUTPATIENT)
Age: 77
End: 2022-10-14

## 2022-12-14 ENCOUNTER — RX RENEWAL (OUTPATIENT)
Age: 77
End: 2022-12-14

## 2022-12-16 ENCOUNTER — TRANSCRIPTION ENCOUNTER (OUTPATIENT)
Age: 77
End: 2022-12-16

## 2023-01-13 ENCOUNTER — NON-APPOINTMENT (OUTPATIENT)
Age: 78
End: 2023-01-13

## 2023-01-13 ENCOUNTER — APPOINTMENT (OUTPATIENT)
Dept: CARDIOLOGY | Facility: CLINIC | Age: 78
End: 2023-01-13
Payer: MEDICARE

## 2023-01-13 VITALS
HEIGHT: 70 IN | OXYGEN SATURATION: 98 % | DIASTOLIC BLOOD PRESSURE: 92 MMHG | SYSTOLIC BLOOD PRESSURE: 168 MMHG | HEART RATE: 60 BPM | TEMPERATURE: 97.3 F | BODY MASS INDEX: 21.19 KG/M2 | WEIGHT: 148 LBS

## 2023-01-13 VITALS — SYSTOLIC BLOOD PRESSURE: 168 MMHG | DIASTOLIC BLOOD PRESSURE: 86 MMHG

## 2023-01-13 PROCEDURE — 99214 OFFICE O/P EST MOD 30 MIN: CPT

## 2023-01-13 PROCEDURE — 93000 ELECTROCARDIOGRAM COMPLETE: CPT

## 2023-01-16 NOTE — ASSESSMENT
[FreeTextEntry1] : follow-up echocardiogram stress test and carotid Doppler \par Do not see need for dual antiplatelet therapy.  Discontinue aspirin. \par Continue beta-blocker and statin.\par \par CAD by CT scan with significant burden of calcified plaque. Total score 1774. \par Cont antiplatelet, BB, and statin therapy.\par discussed indication for crossover to angiogram guided strategy if symptoms or high risk features on noninvasive testing \par \par HTN: BP well controlled. on Metoprolol Succinate 50mg daily.\par HLD: Cont crestor 10mg daily. LDL is <70.  \par Carotid atherosclerosis, mod nonobx. Asx. Antiplatelet and statin rx as above. \par MR, mild. Surveillance monitoring. No CHF. \par

## 2023-01-16 NOTE — HISTORY OF PRESENT ILLNESS
[FreeTextEntry1] : EBEN JIMENES  is a 77 year old  M\par \par h/o HTN, HL, carotid atherosclerosis, VHD, pre DM. \par History of clear-cell renal cell carcinoma followed at [facility].\par \par There is no prior history of a clinical myocardial infarction, coronary revascularization. \par There is no history of symptomatic congestive heart failure rheumatic heart disease\par There is no history of symptomatic arrhythmias including atrial fibrillation.\par \par Atypical chest discomfort resolved with belching.  \par Walks 4 to 6 miles.  No functional limitations. \par No exertional symptoms \par at times he has awareness of his heartbeat. \par He denies palpitations, unusual shortness of breath, orthopnea, LE edema, lightheadedness, dizziness, near syncope or syncope.\par \par Labs January 2023 creatinine 1.0 LDL 65 hemoglobin 13.5 \par EKG has sinus rhythm with nonspecific ST changes \par \par Prior CAT scan had severely elevated coronary artery calcification.  FFR analysis had potential functional significance.  Recommendation was for coronary angiography.  Coronary angiography June 2021 moderate LAD 60% eccentric heavily calcified moderate obtuse marginal 1 mild to moderate RCA FFR negative of LAD medical therapy recommended \par \par LP(a) within normal limits.  \par \par Now lives full-time in the [place]. Retired.\par \par 4/2022 echo LVEF 55% mild MR normal PASP.  Ascending aorta 3.8 cm\par Carotid Doppler mild to moderate nonobstructive plaque bilaterally no significant change noted\par 3-day monitor May 2022 overall normal sinus rhythm average 68 bpm rare ectopy less than 1% burden\par Nuclear stress test May 2022 exercise 10 minutes 44 seconds normal CV response EKG changes suggestive of ischemia.  Small mild septal wall defect partially reversible consistent with infarct and moderate veronika-infarct ischemia, normal prone imaging.  This is consistent with nuclear stress test findings from 2015 no new ischemia is noted. \par \par He was then sent for CCTA 5/17/21. Total Ca score 1774. \par LM 1\par  <50% stenosis\par LCx 138 <30% stenosis\par RCA 1072 <50% stenosis\par Cardiac cath as detailed above\par \par Abd u/s 4/5/21: No AAA. Mild plaque. Ectatic abd aorta.\par \par

## 2023-03-19 ENCOUNTER — RX RENEWAL (OUTPATIENT)
Age: 78
End: 2023-03-19

## 2023-03-22 ENCOUNTER — RX ONLY (RX ONLY)
Age: 78
End: 2023-03-22

## 2023-03-22 ENCOUNTER — OFFICE (OUTPATIENT)
Dept: URBAN - METROPOLITAN AREA CLINIC 9 | Facility: CLINIC | Age: 78
Setting detail: OPHTHALMOLOGY
End: 2023-03-22
Payer: MEDICARE

## 2023-03-22 DIAGNOSIS — H43.813: ICD-10-CM

## 2023-03-22 DIAGNOSIS — H16.223: ICD-10-CM

## 2023-03-22 DIAGNOSIS — Z96.1: ICD-10-CM

## 2023-03-22 PROCEDURE — 92014 COMPRE OPH EXAM EST PT 1/>: CPT | Performed by: OPHTHALMOLOGY

## 2023-03-22 ASSESSMENT — REFRACTION_AUTOREFRACTION
OD_AXIS: 010
OD_SPHERE: -2.75
OS_AXIS: 160
OD_CYLINDER: +1.25
OS_CYLINDER: +1.75
OS_SPHERE: -3.00

## 2023-03-22 ASSESSMENT — REFRACTION_MANIFEST
OD_VA2: 20/20(J1+)
OD_ADD: +2.00
OS_ADD: +2.00
OD_VA2: 20/20(J1+)
OU_VA: 20/20
OU_VA: 20/20
OD_SPHERE: -2.25
OD_CYLINDER: SPH
OS_VA2: 20/20(J1+)
OS_SPHERE: -2.25
OS_CYLINDER: SPH
OD_CYLINDER: SPH
OS_VA1: 20/20
OS_SPHERE: -2.25
OD_VA1: 20/20-2
OS_CYLINDER: SPH
OS_VA2: 20/20(J1+)
OS_VA1: 20/20
OD_SPHERE: -2.25
OD_VA1: 20/20-2

## 2023-03-22 ASSESSMENT — VISUAL ACUITY
OD_BCVA: 20/20
OS_BCVA: 20/20-2

## 2023-03-22 ASSESSMENT — CONFRONTATIONAL VISUAL FIELD TEST (CVF)
OS_FINDINGS: FULL
OD_FINDINGS: FULL

## 2023-03-22 ASSESSMENT — AXIALLENGTH_DERIVED
OD_AL: 24.2542
OS_AL: 24.2542

## 2023-03-22 ASSESSMENT — REFRACTION_CURRENTRX
OS_CYLINDER: SPH
OD_SPHERE: -2.25
OS_OVR_VA: 20/
OD_VPRISM_DIRECTION: SV
OS_SPHERE: -2.25
OS_VPRISM_DIRECTION: SV
OD_OVR_VA: 20/
OD_CYLINDER: SPH

## 2023-03-22 ASSESSMENT — KERATOMETRY
OS_K1POWER_DIOPTERS: 43.50
METHOD_AUTO_MANUAL: AUTO
OS_K2POWER_DIOPTERS: 44.50
OD_K1POWER_DIOPTERS: 44.00
OS_AXISANGLE_DEGREES: 167
OD_AXISANGLE_DEGREES: 090
OD_K2POWER_DIOPTERS: 44.00

## 2023-03-22 ASSESSMENT — SPHEQUIV_DERIVED
OS_SPHEQUIV: -2.125
OD_SPHEQUIV: -2.125

## 2023-04-20 ENCOUNTER — APPOINTMENT (OUTPATIENT)
Dept: CARDIOLOGY | Facility: CLINIC | Age: 78
End: 2023-04-20
Payer: MEDICARE

## 2023-04-20 PROCEDURE — 93306 TTE W/DOPPLER COMPLETE: CPT

## 2023-04-20 PROCEDURE — 93880 EXTRACRANIAL BILAT STUDY: CPT

## 2023-05-24 ENCOUNTER — APPOINTMENT (OUTPATIENT)
Dept: CARDIOLOGY | Facility: CLINIC | Age: 78
End: 2023-05-24
Payer: MEDICARE

## 2023-05-24 PROCEDURE — 93351 STRESS TTE COMPLETE: CPT

## 2023-05-24 PROCEDURE — 93320 DOPPLER ECHO COMPLETE: CPT

## 2023-06-02 ENCOUNTER — APPOINTMENT (OUTPATIENT)
Dept: CARDIOLOGY | Facility: CLINIC | Age: 78
End: 2023-06-02
Payer: MEDICARE

## 2023-06-02 VITALS
OXYGEN SATURATION: 97 % | DIASTOLIC BLOOD PRESSURE: 70 MMHG | WEIGHT: 147 LBS | BODY MASS INDEX: 21.05 KG/M2 | HEART RATE: 68 BPM | HEIGHT: 70 IN | SYSTOLIC BLOOD PRESSURE: 146 MMHG

## 2023-06-02 DIAGNOSIS — R07.89 OTHER CHEST PAIN: ICD-10-CM

## 2023-06-02 PROCEDURE — 99214 OFFICE O/P EST MOD 30 MIN: CPT

## 2023-06-02 RX ORDER — GABAPENTIN 100 MG/1
100 CAPSULE ORAL
Qty: 90 | Refills: 0 | Status: DISCONTINUED | COMMUNITY
Start: 2022-06-09 | End: 2023-06-02

## 2023-06-02 RX ORDER — ASPIRIN 81 MG/1
81 TABLET ORAL DAILY
Refills: 0 | Status: DISCONTINUED | COMMUNITY
End: 2023-06-02

## 2023-07-02 NOTE — ASSESSMENT
[FreeTextEntry1] : Recent noninvasive testing has been reviewed.  \par Follow-up blood work has been requested.  \par Medications refilled.  \par instructed to notify our office if any new symptoms \par clinical follow-up will be scheduled after requested blood work\par \par CAD by CT scan with significant burden of calcified plaque. Total score 1774. \par Cont antiplatelet, BB, and statin therapy.\par discussed indication for crossover to angiogram guided strategy if symptoms or high risk features on noninvasive testing \par \par HTN: BP well controlled. on Metoprolol Succinate 50mg daily.\par HLD: Cont crestor 10mg daily. LDL is <70.  \par Carotid atherosclerosis, mod nonobx. Asx. Antiplatelet and statin rx as above. \par MR, mild. Surveillance monitoring. No CHF.

## 2023-07-02 NOTE — HISTORY OF PRESENT ILLNESS
[FreeTextEntry1] : EBEN JIMENES  is a 77 year old  M\par \par \par h/o HTN, HL, carotid atherosclerosis, VHD, pre DM. \par History of clear-cell renal cell carcinoma followed at [facility] [place].\par \par There is no prior history of a clinical myocardial infarction, coronary revascularization. \par There is no history of symptomatic congestive heart failure rheumatic heart disease\par There is no history of symptomatic arrhythmias including atrial fibrillation.\par \par Atypical chest discomfort resolved with belching.  \par also attributes to musculoskeletal pain.\par Walks 4 to 6 miles.  No functional limitations. \par No exertional symptoms \par at times he has awareness of his heartbeat. \par He denies palpitations, unusual shortness of breath, orthopnea, LE edema, lightheadedness, dizziness, near syncope or syncope.\par \par Echocardiogram demonstrates normal left ventricular function mild valvular heart disease \par carotid Doppler mild to moderate atherosclerosis left greater than right \par stress test no ischemia \par Labs January 2023 creatinine 1.0 LDL 65 hemoglobin 13.5 \par EKG has sinus rhythm with nonspecific ST changes \par Prior CAT scan had severely elevated coronary artery calcification.  FFR analysis had potential functional significance.  Recommendation was for coronary angiography.  Coronary angiography June 2021 moderate LAD 60% eccentric heavily calcified moderate obtuse marginal 1 mild to moderate RCA FFR negative of LAD medical therapy recommended \par LP(a) within normal limits.  \par \par Now lives full-time in the [place]. Retired.\par \par 4/2022 echo LVEF 55% mild MR normal PASP.  Ascending aorta 3.8 cm\par Carotid Doppler mild to moderate nonobstructive plaque bilaterally no significant change noted\par 3-day monitor May 2022 overall normal sinus rhythm average 68 bpm rare ectopy less than 1% burden\par Nuclear stress test May 2022 exercise 10 minutes 44 seconds normal CV response EKG changes suggestive of ischemia.  Small mild septal wall defect partially reversible consistent with infarct and moderate veronika-infarct ischemia, normal prone imaging.  This is consistent with nuclear stress test findings from 2015 no new ischemia is noted. \par \par He was then sent for CCTA 5/17/21. Total Ca score 1774. \par LM 1\par  <50% stenosis\par LCx 138 <30% stenosis\par RCA 1072 <50% stenosis\par Cardiac cath as detailed above\par \par Abd u/s 4/5/21: No AAA. Mild plaque. Ectatic abd aorta.\par  [Takes medication as prescribed] : takes [None] : Patient does not have any barriers to medication adherence

## 2023-12-08 ENCOUNTER — NON-APPOINTMENT (OUTPATIENT)
Age: 78
End: 2023-12-08

## 2023-12-08 ENCOUNTER — APPOINTMENT (OUTPATIENT)
Dept: CARDIOLOGY | Facility: CLINIC | Age: 78
End: 2023-12-08
Payer: MEDICARE

## 2023-12-08 VITALS
BODY MASS INDEX: 21.19 KG/M2 | HEIGHT: 70 IN | SYSTOLIC BLOOD PRESSURE: 120 MMHG | HEART RATE: 71 BPM | OXYGEN SATURATION: 98 % | DIASTOLIC BLOOD PRESSURE: 74 MMHG | WEIGHT: 148 LBS

## 2023-12-08 DIAGNOSIS — I65.23 OCCLUSION AND STENOSIS OF BILATERAL CAROTID ARTERIES: ICD-10-CM

## 2023-12-08 DIAGNOSIS — E78.5 HYPERLIPIDEMIA, UNSPECIFIED: ICD-10-CM

## 2023-12-08 DIAGNOSIS — I25.10 ATHEROSCLEROTIC HEART DISEASE OF NATIVE CORONARY ARTERY W/OUT ANGINA PECTORIS: ICD-10-CM

## 2023-12-08 DIAGNOSIS — I10 ESSENTIAL (PRIMARY) HYPERTENSION: ICD-10-CM

## 2023-12-08 PROCEDURE — 99214 OFFICE O/P EST MOD 30 MIN: CPT

## 2023-12-08 PROCEDURE — 93000 ELECTROCARDIOGRAM COMPLETE: CPT

## 2024-04-23 ENCOUNTER — APPOINTMENT (OUTPATIENT)
Dept: CARDIOLOGY | Facility: CLINIC | Age: 79
End: 2024-04-23
Payer: MEDICARE

## 2024-04-23 PROCEDURE — 93880 EXTRACRANIAL BILAT STUDY: CPT

## 2024-04-23 PROCEDURE — 93306 TTE W/DOPPLER COMPLETE: CPT

## 2024-04-23 NOTE — ASSESSMENT
[FreeTextEntry1] : Recent blood work has been reviewed.  EKG reviewed.  Follow-up ultrasound imaging and stress test has been requested.  Instructed to notify our office of any new symptoms.   CAD by CT scan with significant burden of calcified plaque. Total score 1774. Cont antiplatelet, BB, and statin therapy. discussed indication for crossover to angiogram guided strategy if symptoms or high risk features on noninvasive testing  HTN: BP well controlled. on Metoprolol Succinate 50mg daily. HLD: Cont crestor 10mg daily. LDL is <70. Carotid atherosclerosis, mod nonobx. Asx. Antiplatelet and statin rx as above. MR, mild. Surveillance monitoring. No CHF.

## 2024-04-23 NOTE — HISTORY OF PRESENT ILLNESS
[FreeTextEntry1] : EBEN JIMENES  is a 78 year old  M  h/o HTN, HL, carotid atherosclerosis, VHD, pre DM. History of clear-cell renal cell carcinoma followed at [facility] [place].  There is no prior history of a clinical myocardial infarction, coronary revascularization. There is no history of symptomatic congestive heart failure rheumatic heart disease There is no history of symptomatic arrhythmias including atrial fibrillation.  There is atypical left-sided chest discomfort related to indigestion and musculoskeletal strain.  Walks 4 to 6 miles. No functional limitations. No exertional symptoms at times he has awareness of his heartbeat. He denies palpitations, unusual shortness of breath, orthopnea, LE edema, lightheadedness, dizziness, near syncope or syncope.  Today's EKG demonstrates normal sinus rhythm.  Blood work November 2023 hemoglobin 13.5 creatinine 1.0 potassium 4.4 total cholesterol 139 LDL 61 TSH 2.9  Echocardiogram demonstrates normal left ventricular function mild valvular heart disease carotid Doppler mild to moderate atherosclerosis left greater than right stress test no ischemia Prior CAT scan had severely elevated coronary artery calcification. FFR analysis had potential functional significance. Recommendation was for coronary angiography. Coronary angiography June 2021 moderate LAD 60% eccentric heavily calcified moderate obtuse marginal 1 mild to moderate RCA FFR negative of LAD medical therapy recommended 3-day monitor May 2022 overall normal sinus rhythm average 68 bpm rare ectopy less than 1% burden Nuclear stress test May 2022 exercise 10 minutes 44 seconds normal CV response EKG changes suggestive of ischemia. Small mild septal wall defect partially reversible consistent with infarct and moderate veronika-infarct ischemia, normal prone imaging. This is consistent with nuclear stress test findings from 2015 no new ischemia is noted. Abd u/s 4/5/21: No AAA. Mild plaque. Ectatic abd aorta. LP(a) within normal limits.  Now lives full-time in the [place]. Retired.

## 2024-05-05 ENCOUNTER — RX RENEWAL (OUTPATIENT)
Age: 79
End: 2024-05-05

## 2024-05-06 RX ORDER — CLOPIDOGREL BISULFATE 75 MG/1
75 TABLET, FILM COATED ORAL
Qty: 90 | Refills: 2 | Status: ACTIVE | COMMUNITY
Start: 2019-07-02 | End: 1900-01-01

## 2024-05-06 RX ORDER — ROSUVASTATIN CALCIUM 10 MG/1
10 TABLET, FILM COATED ORAL
Qty: 90 | Refills: 2 | Status: ACTIVE | COMMUNITY
Start: 2017-11-13 | End: 1900-01-01

## 2024-05-24 ENCOUNTER — OFFICE (OUTPATIENT)
Dept: URBAN - METROPOLITAN AREA CLINIC 9 | Facility: CLINIC | Age: 79
Setting detail: OPHTHALMOLOGY
End: 2024-05-24
Payer: MEDICARE

## 2024-05-24 DIAGNOSIS — H16.223: ICD-10-CM

## 2024-05-24 DIAGNOSIS — H35.362: ICD-10-CM

## 2024-05-24 DIAGNOSIS — Z96.1: ICD-10-CM

## 2024-05-24 DIAGNOSIS — H52.4: ICD-10-CM

## 2024-05-24 DIAGNOSIS — H43.813: ICD-10-CM

## 2024-05-24 PROCEDURE — 92014 COMPRE OPH EXAM EST PT 1/>: CPT | Performed by: OPHTHALMOLOGY

## 2024-05-24 PROCEDURE — 92015 DETERMINE REFRACTIVE STATE: CPT | Performed by: OPHTHALMOLOGY

## 2024-05-24 ASSESSMENT — CONFRONTATIONAL VISUAL FIELD TEST (CVF)
OD_FINDINGS: FULL
OS_FINDINGS: FULL

## 2024-06-06 ENCOUNTER — APPOINTMENT (OUTPATIENT)
Dept: CARDIOLOGY | Facility: CLINIC | Age: 79
End: 2024-06-06
Payer: MEDICARE

## 2024-06-06 PROCEDURE — A9502: CPT

## 2024-06-06 PROCEDURE — 93015 CV STRESS TEST SUPVJ I&R: CPT

## 2024-06-06 PROCEDURE — 78452 HT MUSCLE IMAGE SPECT MULT: CPT

## 2024-06-21 ENCOUNTER — APPOINTMENT (OUTPATIENT)
Dept: CARDIOLOGY | Facility: CLINIC | Age: 79
End: 2024-06-21

## 2024-07-23 ENCOUNTER — APPOINTMENT (OUTPATIENT)
Dept: CARDIOLOGY | Facility: CLINIC | Age: 79
End: 2024-07-23
Payer: MEDICARE

## 2024-07-23 VITALS
HEIGHT: 70 IN | BODY MASS INDEX: 20.33 KG/M2 | HEART RATE: 70 BPM | DIASTOLIC BLOOD PRESSURE: 72 MMHG | OXYGEN SATURATION: 98 % | WEIGHT: 142 LBS | SYSTOLIC BLOOD PRESSURE: 120 MMHG

## 2024-07-23 DIAGNOSIS — E78.5 HYPERLIPIDEMIA, UNSPECIFIED: ICD-10-CM

## 2024-07-23 DIAGNOSIS — I34.0 NONRHEUMATIC MITRAL (VALVE) INSUFFICIENCY: ICD-10-CM

## 2024-07-23 DIAGNOSIS — I25.10 ATHEROSCLEROTIC HEART DISEASE OF NATIVE CORONARY ARTERY W/OUT ANGINA PECTORIS: ICD-10-CM

## 2024-07-23 DIAGNOSIS — I10 ESSENTIAL (PRIMARY) HYPERTENSION: ICD-10-CM

## 2024-07-23 PROCEDURE — 99214 OFFICE O/P EST MOD 30 MIN: CPT

## 2024-07-23 NOTE — HISTORY OF PRESENT ILLNESS
[FreeTextEntry1] : EBEN JIMENES  is a 78 year old  M Follow-up blood work requested.  Recent testing reviewed.  Clinical follow-up in 6 months.  Instructed to notify if any new symptoms.  Last seen December 2023.  In the interim he had noninvasive testing performed.  Echocardiogram April 2024.  Normal left ventricular function mild mitral regurgitation.  Carotid Doppler mild to moderate plaque.  Stress test equivocal EKG with small fixed defect PVCs no ischemia normal left ventricular function recent noninvasive testing reviewed.  No significant ischemic heart disease.  Mild valvular heart disease.  Subclinical atherosclerosis. h/o HTN, HL, carotid atherosclerosis, VHD, pre DM. History of clear-cell renal cell carcinoma followed at [facility].  There is no prior history of a clinical myocardial infarction, coronary revascularization. There is no history of symptomatic congestive heart failure rheumatic heart disease There is no history of symptomatic arrhythmias including atrial fibrillation.  Walks 4 to 6 miles.  No functional limitations. No exertional symptoms He denies palpitations, unusual shortness of breath, orthopnea, LE edema, lightheadedness, dizziness, near syncope or syncope.  EKG demonstrates normal sinus rhythm.  Blood work November 2023 hemoglobin 13.5 creatinine 1.0 potassium 4.4 total cholesterol 139 LDL 61 TSH 2.9  Prior CAT scan had severely elevated coronary artery calcification. FFR analysis had potential functional significance. Recommendation was for coronary angiography. Coronary angiography June 2021 moderate LAD 60% eccentric heavily calcified moderate obtuse marginal 1 mild to moderate RCA FFR negative of LAD medical therapy recommended 3-day monitor May 2022 overall normal sinus rhythm average 68 bpm rare ectopy less than 1% burden Abd u/s 4/5/21: No AAA. Mild plaque. Ectatic abd aorta. LP(a) within normal limits.  Now lives full-time in the [place]. Retired.  Instructed to notify our office of any new symptoms.   CAD by CT scan with significant burden of calcified plaque. Total score 1774. Cont antiplatelet, BB, and statin therapy. discussed indication for crossover to angiogram guided strategy if symptoms or high risk features on noninvasive testing  HTN: BP well controlled. on Metoprolol Succinate 50mg daily. HLD: Cont crestor 10mg daily. LDL is <70. Carotid atherosclerosis, mod nonobx. Asx. Antiplatelet and statin rx as above. MR, mild. Surveillance monitoring. No CHF.

## 2024-07-23 NOTE — HISTORY OF PRESENT ILLNESS
[FreeTextEntry1] : EBEN JIMENES  is a 78 year old  M Follow-up blood work requested.  Recent testing reviewed.  Clinical follow-up in 6 months.  Instructed to notify if any new symptoms.  Last seen December 2023.  In the interim he had noninvasive testing performed.  Echocardiogram April 2024.  Normal left ventricular function mild mitral regurgitation.  Carotid Doppler mild to moderate plaque.  Stress test equivocal EKG with small fixed defect PVCs no ischemia normal left ventricular function recent noninvasive testing reviewed.  No significant ischemic heart disease.  Mild valvular heart disease.  Subclinical atherosclerosis. h/o HTN, HL, carotid atherosclerosis, VHD, pre DM. History of clear-cell renal cell carcinoma followed at [facility].  There is no prior history of a clinical myocardial infarction, coronary revascularization. There is no history of symptomatic congestive heart failure rheumatic heart disease There is no history of symptomatic arrhythmias including atrial fibrillation.  Walks 4 to 6 miles.  No functional limitations. No exertional symptoms He denies palpitations, unusual shortness of breath, orthopnea, LE edema, lightheadedness, dizziness, near syncope or syncope.  EKG demonstrates normal sinus rhythm.  Blood work November 2023 hemoglobin 13.5 creatinine 1.0 potassium 4.4 total cholesterol 139 LDL 61 TSH 2.9  Prior CAT scan had severely elevated coronary artery calcification. FFR analysis had potential functional significance. Recommendation was for coronary angiography. Coronary angiography June 2021 moderate LAD 60% eccentric heavily calcified moderate obtuse marginal 1 mild to moderate RCA FFR negative of LAD medical therapy recommended 3-day monitor May 2022 overall normal sinus rhythm average 68 bpm rare ectopy less than 1% burden Abd u/s 4/5/21: No AAA. Mild plaque. Ectatic abd aorta. LP(a) within normal limits.  Now lives full-time in the [place]. Retired.  Instructed to notify our office of any new symptoms.   CAD by CT scan with significant burden of calcified plaque. Total score 1774. Cont antiplatelet, BB, and statin therapy. discussed indication for crossover to angiogram guided strategy if symptoms or high risk features on noninvasive testing  HTN: BP well controlled. on Metoprolol Succinate 50mg daily. HLD: Cont crestor 10mg daily. LDL is <70. Carotid atherosclerosis, mod nonobx. Asx. Antiplatelet and statin rx as above. MR, mild. Surveillance monitoring. No CHF.

## 2024-12-26 ENCOUNTER — RX RENEWAL (OUTPATIENT)
Age: 79
End: 2024-12-26

## 2025-03-04 ENCOUNTER — APPOINTMENT (OUTPATIENT)
Dept: CARDIOLOGY | Facility: CLINIC | Age: 80
End: 2025-03-04
Payer: MEDICARE

## 2025-03-04 ENCOUNTER — NON-APPOINTMENT (OUTPATIENT)
Age: 80
End: 2025-03-04

## 2025-03-04 VITALS
DIASTOLIC BLOOD PRESSURE: 80 MMHG | WEIGHT: 148 LBS | HEIGHT: 70 IN | SYSTOLIC BLOOD PRESSURE: 150 MMHG | BODY MASS INDEX: 21.19 KG/M2 | OXYGEN SATURATION: 98 % | HEART RATE: 69 BPM

## 2025-03-04 DIAGNOSIS — I25.10 ATHEROSCLEROTIC HEART DISEASE OF NATIVE CORONARY ARTERY W/OUT ANGINA PECTORIS: ICD-10-CM

## 2025-03-04 DIAGNOSIS — I10 ESSENTIAL (PRIMARY) HYPERTENSION: ICD-10-CM

## 2025-03-04 DIAGNOSIS — I34.0 NONRHEUMATIC MITRAL (VALVE) INSUFFICIENCY: ICD-10-CM

## 2025-03-04 DIAGNOSIS — I65.23 OCCLUSION AND STENOSIS OF BILATERAL CAROTID ARTERIES: ICD-10-CM

## 2025-03-04 DIAGNOSIS — E78.5 HYPERLIPIDEMIA, UNSPECIFIED: ICD-10-CM

## 2025-03-04 DIAGNOSIS — R07.89 OTHER CHEST PAIN: ICD-10-CM

## 2025-03-04 DIAGNOSIS — R94.39 ABNORMAL RESULT OF OTHER CARDIOVASCULAR FUNCTION STUDY: ICD-10-CM

## 2025-03-04 PROCEDURE — 93000 ELECTROCARDIOGRAM COMPLETE: CPT

## 2025-03-04 PROCEDURE — 99214 OFFICE O/P EST MOD 30 MIN: CPT

## 2025-05-06 ENCOUNTER — APPOINTMENT (OUTPATIENT)
Dept: CARDIOLOGY | Facility: CLINIC | Age: 80
End: 2025-05-06
Payer: MEDICARE

## 2025-05-06 PROCEDURE — 93306 TTE W/DOPPLER COMPLETE: CPT

## 2025-05-06 PROCEDURE — 93880 EXTRACRANIAL BILAT STUDY: CPT

## 2025-06-11 ENCOUNTER — APPOINTMENT (OUTPATIENT)
Dept: CARDIOLOGY | Facility: CLINIC | Age: 80
End: 2025-06-11
Payer: MEDICARE

## 2025-06-11 PROCEDURE — 93320 DOPPLER ECHO COMPLETE: CPT

## 2025-06-11 PROCEDURE — 93351 STRESS TTE COMPLETE: CPT

## 2025-06-16 ENCOUNTER — APPOINTMENT (OUTPATIENT)
Dept: CARDIOLOGY | Facility: CLINIC | Age: 80
End: 2025-06-16
Payer: MEDICARE

## 2025-06-16 VITALS
HEART RATE: 71 BPM | HEIGHT: 70 IN | OXYGEN SATURATION: 98 % | DIASTOLIC BLOOD PRESSURE: 74 MMHG | WEIGHT: 150 LBS | BODY MASS INDEX: 21.47 KG/M2 | SYSTOLIC BLOOD PRESSURE: 122 MMHG

## 2025-06-16 PROBLEM — I65.29 CAROTID ATHEROSCLEROSIS, UNSPECIFIED LATERALITY: Status: ACTIVE | Noted: 2025-06-16

## 2025-06-16 PROCEDURE — 99214 OFFICE O/P EST MOD 30 MIN: CPT

## 2025-08-06 ENCOUNTER — RX RENEWAL (OUTPATIENT)
Age: 80
End: 2025-08-06

## 2025-09-03 ENCOUNTER — RX RENEWAL (OUTPATIENT)
Age: 80
End: 2025-09-03